# Patient Record
Sex: MALE | Race: OTHER | HISPANIC OR LATINO | URBAN - METROPOLITAN AREA
[De-identification: names, ages, dates, MRNs, and addresses within clinical notes are randomized per-mention and may not be internally consistent; named-entity substitution may affect disease eponyms.]

---

## 2017-02-07 ENCOUNTER — INPATIENT (INPATIENT)
Facility: HOSPITAL | Age: 62
LOS: 1 days | Discharge: HOME | DRG: 352 | End: 2017-02-09
Attending: SURGERY | Admitting: SURGERY
Payer: COMMERCIAL

## 2017-02-07 VITALS
HEIGHT: 67 IN | RESPIRATION RATE: 18 BRPM | TEMPERATURE: 98 F | WEIGHT: 147.49 LBS | SYSTOLIC BLOOD PRESSURE: 111 MMHG | DIASTOLIC BLOOD PRESSURE: 74 MMHG | HEART RATE: 88 BPM | OXYGEN SATURATION: 98 %

## 2017-02-07 DIAGNOSIS — Z98.89 OTHER SPECIFIED POSTPROCEDURAL STATES: Chronic | ICD-10-CM

## 2017-02-07 DIAGNOSIS — Z90.79 ACQUIRED ABSENCE OF OTHER GENITAL ORGAN(S): Chronic | ICD-10-CM

## 2017-02-07 LAB
APPEARANCE UR: CLEAR — SIGNIFICANT CHANGE UP
BILIRUB UR-MCNC: NEGATIVE — SIGNIFICANT CHANGE UP
COLOR SPEC: YELLOW — SIGNIFICANT CHANGE UP
DIFF PNL FLD: (no result)
GLUCOSE UR QL: NEGATIVE — SIGNIFICANT CHANGE UP
KETONES UR-MCNC: NEGATIVE — SIGNIFICANT CHANGE UP
LEUKOCYTE ESTERASE UR-ACNC: NEGATIVE — SIGNIFICANT CHANGE UP
NITRITE UR-MCNC: NEGATIVE — SIGNIFICANT CHANGE UP
PH UR: 7 — SIGNIFICANT CHANGE UP (ref 4–8)
PROT UR-MCNC: NEGATIVE MG/DL — SIGNIFICANT CHANGE UP
SP GR SPEC: 1.01 — SIGNIFICANT CHANGE UP (ref 1–1.03)
UROBILINOGEN FLD QL: 0.2 E.U./DL — SIGNIFICANT CHANGE UP

## 2017-02-07 PROCEDURE — 71010: CPT | Mod: 26

## 2017-02-07 PROCEDURE — 93010 ELECTROCARDIOGRAM REPORT: CPT

## 2017-02-07 PROCEDURE — 99284 EMERGENCY DEPT VISIT MOD MDM: CPT

## 2017-02-07 PROCEDURE — 74177 CT ABD & PELVIS W/CONTRAST: CPT | Mod: 26

## 2017-02-07 RX ORDER — SODIUM CHLORIDE 9 MG/ML
1000 INJECTION INTRAMUSCULAR; INTRAVENOUS; SUBCUTANEOUS ONCE
Qty: 0 | Refills: 0 | Status: COMPLETED | OUTPATIENT
Start: 2017-02-07 | End: 2017-02-07

## 2017-02-07 RX ORDER — DOCUSATE SODIUM 100 MG
100 CAPSULE ORAL DAILY
Qty: 0 | Refills: 0 | Status: DISCONTINUED | OUTPATIENT
Start: 2017-02-07 | End: 2017-02-09

## 2017-02-07 RX ORDER — LEVOTHYROXINE SODIUM 125 MCG
12.5 TABLET ORAL DAILY
Qty: 0 | Refills: 0 | Status: DISCONTINUED | OUTPATIENT
Start: 2017-02-08 | End: 2017-02-09

## 2017-02-07 RX ORDER — ONDANSETRON 8 MG/1
4 TABLET, FILM COATED ORAL EVERY 6 HOURS
Qty: 0 | Refills: 0 | Status: DISCONTINUED | OUTPATIENT
Start: 2017-02-07 | End: 2017-02-09

## 2017-02-07 RX ORDER — HEPARIN SODIUM 5000 [USP'U]/ML
5000 INJECTION INTRAVENOUS; SUBCUTANEOUS EVERY 8 HOURS
Qty: 0 | Refills: 0 | Status: DISCONTINUED | OUTPATIENT
Start: 2017-02-07 | End: 2017-02-09

## 2017-02-07 RX ORDER — SODIUM CHLORIDE 9 MG/ML
1000 INJECTION INTRAMUSCULAR; INTRAVENOUS; SUBCUTANEOUS
Qty: 0 | Refills: 0 | Status: DISCONTINUED | OUTPATIENT
Start: 2017-02-08 | End: 2017-02-08

## 2017-02-07 RX ORDER — HEPARIN SODIUM 5000 [USP'U]/ML
5000 INJECTION INTRAVENOUS; SUBCUTANEOUS EVERY 12 HOURS
Qty: 0 | Refills: 0 | Status: DISCONTINUED | OUTPATIENT
Start: 2017-02-07 | End: 2017-02-07

## 2017-02-07 RX ORDER — IOHEXOL 300 MG/ML
50 INJECTION, SOLUTION INTRAVENOUS ONCE
Qty: 0 | Refills: 0 | Status: COMPLETED | OUTPATIENT
Start: 2017-02-07 | End: 2017-02-07

## 2017-02-07 RX ADMIN — IOHEXOL 50 MILLILITER(S): 300 INJECTION, SOLUTION INTRAVENOUS at 11:26

## 2017-02-07 RX ADMIN — SODIUM CHLORIDE 1000 MILLILITER(S): 9 INJECTION INTRAMUSCULAR; INTRAVENOUS; SUBCUTANEOUS at 11:38

## 2017-02-07 RX ADMIN — Medication 100 MILLIGRAM(S): at 22:12

## 2017-02-07 NOTE — H&P ADULT. - ASSESSMENT
62yo M w/PMHx of GERD, MG, hypothyroidism, prostate cancer s/p RALP (10/24/16 Dr. Jiménez) who p/w symptomatic L indirect fat-containing inguinal hernia.  Hernia reducible, no obstructive symptoms.      - Admit to team 1 surgery (Dr. Barry)  - OR booked for open left inguinal hernia repair for tomorrow 2/8  - Will pre-op patient  - Regular diet; NPO and IVF @[place]  - Continue home colace, synthroid, ditropan prn  - PPx: SCDs, SQH, OOB/ambulation

## 2017-02-07 NOTE — ED ADULT TRIAGE NOTE - CHIEF COMPLAINT QUOTE
Patient c/o left groin pain for 5 days . Was sent by PMD for r/o  incarcerated hernia . Denies any fever nor chills .

## 2017-02-07 NOTE — ED ADULT NURSE NOTE - OBJECTIVE STATEMENT
Pt is complaining of left groin pain x 5 days that felt worse today. He denies pain at this time. Sent by Tino Hughes for r/o incarcerated hernia. H/o prostate surgery october 2016. Denes recent fever/chills, n/v/d. H/o intermittent incontinence.

## 2017-02-07 NOTE — H&P ADULT. - RS GEN PE MLT RESP DETAILS PC
airway patent/respirations non-labored/breath sounds equal/no wheezes/no rales/no rhonchi/clear to auscultation bilaterally

## 2017-02-07 NOTE — H&P ADULT. - RADIOLOGY RESULTS AND INTERPRETATION
EXAM:  CT ABDOMEN AND PELVIS OC IC     ///      PROCEDURE DATE:  02/07/2017    IMPRESSION:  1.  Minimal fat-containing left indirect inguinal hernia.  2.  1.0 cm lesion at upper pole of right kidney that does not represent a   simple cyst. MRI of the abdomen with and without gadolinium is   recommended for further evaluation.  3.  Mild dilatation of a few small bowel loops without evidence of   significant obstruction.

## 2017-02-07 NOTE — ED PROVIDER NOTE - OBJECTIVE STATEMENT
Patient is a 61 year old male with PMH prostate CA s/p prostatectomy, appendectomy who presents to ED c/o left groin pain for 5 days. The pain had been more mild and intermittent, however became more intense and constant since this morning. He was seen by his urologist Dr. Jiménez who then referred him to surgeon Dr. Sparrow-sent here for CT r/o incarcerated hernia. Pt reports normal bowel movement this morning. Denies vomiting, diarrhea, fever, chills, urinary symptoms, back pain, or testicular pain/swelling.

## 2017-02-07 NOTE — H&P ADULT. - GASTROINTESTINAL DETAILS
no guarding/soft/bowel sounds normal/no rebound tenderness/no distention/no rigidity/palpable L groin mass/nontender

## 2017-02-07 NOTE — H&P ADULT. - HISTORY OF PRESENT ILLNESS
60yo M w/PMHx 60yo M w/PMHx of GERD, MG, hypothyroidism, prostate cancer s/p RALP (10/24/16 Dr. Jiménez) who p/w symptomatic L indirect fat-containing inguinal hernia.  Patient was at a post-op 62yo M w/PMHx of GERD, MG, hypothyroidism, prostate cancer s/p RALP (10/24/16 Dr. Jiménez) who p/w symptomatic L indirect fat-containing inguinal hernia.  Patient was at a post-op visit with Dr. Jiménez when he started to experience severe left inguinal pain.  Tino referred patient to Dr. Barry at that time.  Patient was instructed to go to the ED for further work-up.  In the ED, patient's pain completely resolved.  However, patient still notices occasional twinges when he is walking/moving.  Patient denies n/v/f/c/diarrhea/constipation.  Denies abd pain/cp/sob.  Still passing flatus, +BM.  States that he has had several episodes of this progressively worsening L inguinal pain but that the episode today was the worst he has ever experienced.  Patient c/o incontinence issues at baseline but denies other gu symptoms.

## 2017-02-07 NOTE — ED PROVIDER NOTE - MEDICAL DECISION MAKING DETAILS
61 year old male with left groin pain x few days, worse today; sent by surgery for evaluation of possible hernia. Pt now pain free upon arrival in ED. Labs sent-unremarkable. CT abd/pel with fat containing inguinal hernia, no incarceration, no strangulation. Surgery consulted, came to ED to evaluate at bedside-pt to be admitted to have elective correction of inguinal hernia tomorrow.

## 2017-02-07 NOTE — ED PROVIDER NOTE - ATTENDING CONTRIBUTION TO CARE
pt is a 60yo m, h/o prostate ca s/p prostatectomy, referred to ED by Dr. Barry for right inguinal hernia. + right groin pain x 5 days which became acutely worse today. + associated nausea, no vomiting, diarrhea, constipation, dysuria, hematuria, flank pain, fever, chills. En route to ED, pain resolved and pt is now asymptomatic. Afebrile. HDS. WNWD m in nad, comfortable appearing. + s1, s2, rrr. Lungs cta b/l. Abd soft, nt/nd, no guarding/ rebound, no bulging hernias. Labs reviewed with findings of slight leukocytosis at 11.8. UA + for blood, no signs of infection. CT a/p + for minimal fat containing left inguinal hernia, r kidney lesion, and mild dilatation of a few small bowel loops without evidence of obstruction. Surgical consult obtained; pt admitted to [facility] for operative repair.

## 2017-02-08 LAB
ANION GAP SERPL CALC-SCNC: 6 MMOL/L — LOW (ref 9–16)
BUN SERPL-MCNC: 17 MG/DL — SIGNIFICANT CHANGE UP (ref 7–23)
CALCIUM SERPL-MCNC: 8.6 MG/DL — SIGNIFICANT CHANGE UP (ref 8.5–10.5)
CHLORIDE SERPL-SCNC: 106 MMOL/L — SIGNIFICANT CHANGE UP (ref 96–108)
CO2 SERPL-SCNC: 28 MMOL/L — SIGNIFICANT CHANGE UP (ref 22–31)
CREAT SERPL-MCNC: 1.28 MG/DL — SIGNIFICANT CHANGE UP (ref 0.5–1.3)
GLUCOSE SERPL-MCNC: 77 MG/DL — SIGNIFICANT CHANGE UP (ref 70–99)
HCT VFR BLD CALC: 39.2 % — SIGNIFICANT CHANGE UP (ref 39–50)
HGB BLD-MCNC: 13.3 G/DL — SIGNIFICANT CHANGE UP (ref 13–17)
MAGNESIUM SERPL-MCNC: 2 MG/DL — SIGNIFICANT CHANGE UP (ref 1.6–2.4)
MCHC RBC-ENTMCNC: 29.1 PG — SIGNIFICANT CHANGE UP (ref 27–34)
MCHC RBC-ENTMCNC: 33.9 G/DL — SIGNIFICANT CHANGE UP (ref 32–36)
MCV RBC AUTO: 85.8 FL — SIGNIFICANT CHANGE UP (ref 80–100)
PHOSPHATE SERPL-MCNC: 2.7 MG/DL — SIGNIFICANT CHANGE UP (ref 2.5–4.5)
PLATELET # BLD AUTO: 204 K/UL — SIGNIFICANT CHANGE UP (ref 150–400)
POTASSIUM SERPL-MCNC: 3.9 MMOL/L — SIGNIFICANT CHANGE UP (ref 3.5–5.3)
POTASSIUM SERPL-SCNC: 3.9 MMOL/L — SIGNIFICANT CHANGE UP (ref 3.5–5.3)
RBC # BLD: 4.57 M/UL — SIGNIFICANT CHANGE UP (ref 4.2–5.8)
RBC # FLD: 12.5 % — SIGNIFICANT CHANGE UP (ref 10.3–16.9)
SODIUM SERPL-SCNC: 140 MMOL/L — SIGNIFICANT CHANGE UP (ref 135–145)
WBC # BLD: 9.2 K/UL — SIGNIFICANT CHANGE UP (ref 3.8–10.5)
WBC # FLD AUTO: 9.2 K/UL — SIGNIFICANT CHANGE UP (ref 3.8–10.5)

## 2017-02-08 RX ORDER — HYDROMORPHONE HYDROCHLORIDE 2 MG/ML
1 INJECTION INTRAMUSCULAR; INTRAVENOUS; SUBCUTANEOUS ONCE
Qty: 0 | Refills: 0 | Status: DISCONTINUED | OUTPATIENT
Start: 2017-02-08 | End: 2017-02-09

## 2017-02-08 RX ORDER — SODIUM CHLORIDE 9 MG/ML
3 INJECTION INTRAMUSCULAR; INTRAVENOUS; SUBCUTANEOUS EVERY 8 HOURS
Qty: 0 | Refills: 0 | Status: DISCONTINUED | OUTPATIENT
Start: 2017-02-08 | End: 2017-02-09

## 2017-02-08 RX ORDER — DIPHENHYDRAMINE HCL 50 MG
25 CAPSULE ORAL ONCE
Qty: 0 | Refills: 0 | Status: COMPLETED | OUTPATIENT
Start: 2017-02-08 | End: 2017-02-08

## 2017-02-08 RX ADMIN — HEPARIN SODIUM 5000 UNIT(S): 5000 INJECTION INTRAVENOUS; SUBCUTANEOUS at 21:44

## 2017-02-08 RX ADMIN — SODIUM CHLORIDE 100 MILLILITER(S): 9 INJECTION INTRAMUSCULAR; INTRAVENOUS; SUBCUTANEOUS at 00:01

## 2017-02-08 RX ADMIN — Medication 25 MILLIGRAM(S): at 00:53

## 2017-02-08 NOTE — PROGRESS NOTE ADULT - SUBJECTIVE AND OBJECTIVE BOX
Procedure: Open Left inguinal hernia w/ mesh    Diagnosis/Indication: L inguinal hernia    Surgeon: Jhonny    S: Pt has no complaints. Denies CP, SOB, STOCKTON, calf tenderness. Pain controlled with medication.    O:  T(C): 36.1, Max: 36.7 (02-08 @ 18:40)  T(F): 97, Max: 98.1 (02-08 @ 18:40)  HR: 72 (70 - 79)  BP: 137/88 (116/81 - 137/88)  RR: 17 (16 - 17)  SpO2: 96% (96% - 99%)  Wt(kg): --                        13.3   9.2   )-----------( 204      ( 08 Feb 2017 06:33 )             39.2     08 Feb 2017 06:33    140    |  106    |  17     ----------------------------<  77     3.9     |  28     |  1.28     Ca    8.6        08 Feb 2017 06:33  Phos  2.7       08 Feb 2017 06:33  Mg     2.0       08 Feb 2017 06:33    TPro  7.5    /  Alb  3.7    /  TBili  0.7    /  DBili  x      /  AST  13     /  ALT  22     /  AlkPhos  78     07 Feb 2017 11:23      Gen: NAD, resting comfortably in bed  C/V: NSR  Pulm: Nonlabored breathing, no respiratory distress  Abd: soft, NT/ND, no guarding or rebound tenderness, dressing cdi  Extrem: WWP, no calf edema, SCDs in place      A/P: 61yMale s/p above procedure  Diet: Regular diet  IVF: NS@100  Pain/nausea control  DVT ppx: SQH  Dispo plan: DC tonight or AM

## 2017-02-08 NOTE — PROGRESS NOTE ADULT - SUBJECTIVE AND OBJECTIVE BOX
O/N: Afebrile, VSS. Refusing SQH Hep Afebrile, VSS. Refusing SQH Hep and Levothyroxine  Pt to go to OR today     SUBJECTIVE: Patient seen and examined bedside by chief resident.    heparin  Injectable 5000Unit(s) SubCutaneous every 8 hours      Vital Signs Last 24 Hrs  T(C): 36.3, Max: 36.9 ( @ 11:28)  T(F): 97.3, Max: 98.5 ( @ 11:28)  HR: 72 (66 - 88)  BP: 110/69 (109/71 - 136/84)  BP(mean): --  RR: 17 (15 - 18)  SpO2: 96% (17% - 99%)  I&O's Detail    I & Os for current day (as of 2017 07:34)  =============================================  IN:    sodium chloride 0.9%.: 800 ml    Oral Fluid: 770 ml    Total IN: 1570 ml  ---------------------------------------------  OUT:    Voided: 1450 ml    Total OUT: 1450 ml  ---------------------------------------------  Total NET: 120 ml      General: NAD, resting comfortably in bed  C/V: NSR  Pulm: Nonlabored breathing, no respiratory distress  Abd: soft, NT/ND.  Extrem: WWP, no edema, SCDs in place        LABS:                        13.3   9.2   )-----------( 204      ( 2017 06:33 )             39.2     2017 06:33    140    |  106    |  17     ----------------------------<  77     3.9     |  28     |  1.28     Ca    8.6        2017 06:33  Phos  2.7       2017 06:33  Mg     2.0       2017 06:33    TPro  7.5    /  Alb  3.7    /  TBili  0.7    /  DBili  x      /  AST  13     /  ALT  22     /  AlkPhos  78     2017 11:23    PT/INR - ( 2017 11:23 )   PT: 13.0 sec;   INR: 1.17          PTT - ( 2017 11:23 )  PTT:34.8 sec  Urinalysis Basic - ( 2017 11:30 )    Color: Yellow / Appearance: Clear / S.015 / pH: x  Gluc: x / Ketone: NEGATIVE  / Bili: NEGATIVE / Urobili: 0.2 E.U./dL   Blood: x / Protein: NEGATIVE mg/dL / Nitrite: NEGATIVE   Leuk Esterase: NEGATIVE / RBC: > 10 /HPF / WBC < 5 /HPF   Sq Epi: x / Non Sq Epi: x / Bacteria: Present /HPF        RADIOLOGY & ADDITIONAL STUDIES:

## 2017-02-08 NOTE — PROGRESS NOTE ADULT - ASSESSMENT
60yo M w/PMHx of GERD, MG, hypothyroidism, prostate cancer s/p RALP (10/24/16 Dr. Beasley) who p/w symptomatic L indirect fat-containing inguinal hernia    NPO/IVF  AM labs  OR for open L inguinal hernia repair  IS  OOBA  DVT ppx 60yo M w/PMHx of GERD, MG, hypothyroidism, prostate cancer s/p RALP (10/24/16 Dr. Beasley) who p/w symptomatic L indirect fat-containing inguinal hernia to go to OR today.    NPO/IVF  AM labs  OR for open L inguinal hernia repair  IS  OOBA  DVT ppx

## 2017-02-09 VITALS
RESPIRATION RATE: 17 BRPM | TEMPERATURE: 98 F | OXYGEN SATURATION: 96 % | HEART RATE: 70 BPM | DIASTOLIC BLOOD PRESSURE: 73 MMHG | SYSTOLIC BLOOD PRESSURE: 110 MMHG

## 2017-02-09 LAB
ANION GAP SERPL CALC-SCNC: 7 MMOL/L — LOW (ref 9–16)
BUN SERPL-MCNC: 15 MG/DL — SIGNIFICANT CHANGE UP (ref 7–23)
CALCIUM SERPL-MCNC: 8.8 MG/DL — SIGNIFICANT CHANGE UP (ref 8.5–10.5)
CHLORIDE SERPL-SCNC: 102 MMOL/L — SIGNIFICANT CHANGE UP (ref 96–108)
CO2 SERPL-SCNC: 27 MMOL/L — SIGNIFICANT CHANGE UP (ref 22–31)
CREAT SERPL-MCNC: 1.08 MG/DL — SIGNIFICANT CHANGE UP (ref 0.5–1.3)
GLUCOSE SERPL-MCNC: 76 MG/DL — SIGNIFICANT CHANGE UP (ref 70–99)
HCT VFR BLD CALC: 39.8 % — SIGNIFICANT CHANGE UP (ref 39–50)
HGB BLD-MCNC: 13.7 G/DL — SIGNIFICANT CHANGE UP (ref 13–17)
MAGNESIUM SERPL-MCNC: 1.9 MG/DL — SIGNIFICANT CHANGE UP (ref 1.6–2.4)
MCHC RBC-ENTMCNC: 29.1 PG — SIGNIFICANT CHANGE UP (ref 27–34)
MCHC RBC-ENTMCNC: 34.4 G/DL — SIGNIFICANT CHANGE UP (ref 32–36)
MCV RBC AUTO: 84.7 FL — SIGNIFICANT CHANGE UP (ref 80–100)
PHOSPHATE SERPL-MCNC: 3.5 MG/DL — SIGNIFICANT CHANGE UP (ref 2.5–4.5)
PLATELET # BLD AUTO: 195 K/UL — SIGNIFICANT CHANGE UP (ref 150–400)
POTASSIUM SERPL-MCNC: 3.8 MMOL/L — SIGNIFICANT CHANGE UP (ref 3.5–5.3)
POTASSIUM SERPL-SCNC: 3.8 MMOL/L — SIGNIFICANT CHANGE UP (ref 3.5–5.3)
RBC # BLD: 4.7 M/UL — SIGNIFICANT CHANGE UP (ref 4.2–5.8)
RBC # FLD: 12.1 % — SIGNIFICANT CHANGE UP (ref 10.3–16.9)
SODIUM SERPL-SCNC: 136 MMOL/L — SIGNIFICANT CHANGE UP (ref 135–145)
WBC # BLD: 12.3 K/UL — HIGH (ref 3.8–10.5)
WBC # FLD AUTO: 12.3 K/UL — HIGH (ref 3.8–10.5)

## 2017-02-09 PROCEDURE — 85730 THROMBOPLASTIN TIME PARTIAL: CPT

## 2017-02-09 PROCEDURE — 93005 ELECTROCARDIOGRAM TRACING: CPT

## 2017-02-09 PROCEDURE — C1781: CPT

## 2017-02-09 PROCEDURE — 71045 X-RAY EXAM CHEST 1 VIEW: CPT

## 2017-02-09 PROCEDURE — 85027 COMPLETE CBC AUTOMATED: CPT

## 2017-02-09 PROCEDURE — 99285 EMERGENCY DEPT VISIT HI MDM: CPT | Mod: 25

## 2017-02-09 PROCEDURE — 86901 BLOOD TYPING SEROLOGIC RH(D): CPT

## 2017-02-09 PROCEDURE — 85610 PROTHROMBIN TIME: CPT

## 2017-02-09 PROCEDURE — 84100 ASSAY OF PHOSPHORUS: CPT

## 2017-02-09 PROCEDURE — 83605 ASSAY OF LACTIC ACID: CPT

## 2017-02-09 PROCEDURE — 88304 TISSUE EXAM BY PATHOLOGIST: CPT

## 2017-02-09 PROCEDURE — 36415 COLL VENOUS BLD VENIPUNCTURE: CPT

## 2017-02-09 PROCEDURE — 86900 BLOOD TYPING SEROLOGIC ABO: CPT

## 2017-02-09 PROCEDURE — 80048 BASIC METABOLIC PNL TOTAL CA: CPT

## 2017-02-09 PROCEDURE — 83735 ASSAY OF MAGNESIUM: CPT

## 2017-02-09 PROCEDURE — 86850 RBC ANTIBODY SCREEN: CPT

## 2017-02-09 PROCEDURE — 81003 URINALYSIS AUTO W/O SCOPE: CPT

## 2017-02-09 PROCEDURE — 81001 URINALYSIS AUTO W/SCOPE: CPT

## 2017-02-09 PROCEDURE — 74177 CT ABD & PELVIS W/CONTRAST: CPT

## 2017-02-09 PROCEDURE — 80053 COMPREHEN METABOLIC PANEL: CPT

## 2017-02-09 RX ORDER — LEVOTHYROXINE SODIUM 125 MCG
1 TABLET ORAL
Qty: 0 | Refills: 0 | COMMUNITY
Start: 2017-02-09

## 2017-02-09 RX ORDER — LEVOTHYROXINE SODIUM 125 MCG
25 TABLET ORAL DAILY
Qty: 0 | Refills: 0 | Status: DISCONTINUED | OUTPATIENT
Start: 2017-02-09 | End: 2017-02-09

## 2017-02-09 RX ADMIN — HEPARIN SODIUM 5000 UNIT(S): 5000 INJECTION INTRAVENOUS; SUBCUTANEOUS at 06:15

## 2017-02-09 RX ADMIN — SODIUM CHLORIDE 3 MILLILITER(S): 9 INJECTION INTRAMUSCULAR; INTRAVENOUS; SUBCUTANEOUS at 06:14

## 2017-02-09 NOTE — DISCHARGE NOTE ADULT - CARE PLAN
Goal:	recovery  Instructions for follow-up, activity and diet:	Follow up with Dr. Barry in 2 weeks.  Call [phone] to schedule an appointment.  Regular diet.  No heavy lifting greater than 15lbs.    Take pain medication as prescribed.  Return to ER or call the doctor's office, if you develop fevers greater than 100.5, worsening pain, nausea, vomiting, difficulty urinating. Principal Discharge DX:	Inguinal hernia of left side without obstruction or gangrene  Goal:	recovery  Instructions for follow-up, activity and diet:	Continue to advance diet as tolerated. You may shower, but do not bathe or submerge in water for at least two weeks. You may remove the wound dressing tomorrow. Leave steri-strips in place if present; they will fall off on their own. Please be sure to keep the wound clean and dry, changing any dressings daily (except steri-strips), unless instructed otherwise. Apply ice packs to the left groin as much as tolerable over the next few days to help minimize swelling and pain. No heavy lifting or strenuous exercise until cleared by your surgeon.    Please follow up with Dr. Barry in one to two weeks; you may call the office to make an appointment at your earliest convenience.    Contact your doctor or go to the ER for fever > 101.5, chills, nausea, vomiting, chest pain, shortness of breath, pain not controlled by medication or excessive bleeding.

## 2017-02-09 NOTE — DISCHARGE NOTE ADULT - PATIENT PORTAL LINK FT
“You can access the [facility] Patient Portal, offered by [facility], by registering with the following website: [email or web address]”

## 2017-02-09 NOTE — DISCHARGE NOTE ADULT - NS AS ACTIVITY OBS
Stairs allowed/Showering allowed/Walking-Outdoors allowed/Walking-Indoors allowed/No Heavy lifting/straining

## 2017-02-09 NOTE — DISCHARGE NOTE ADULT - PLAN OF CARE
recovery Follow up with Dr. Barry in 2 weeks.  Call [phone] to schedule an appointment.  Regular diet.  No heavy lifting greater than 15lbs.    Take pain medication as prescribed.  Return to ER or call the doctor's office, if you develop fevers greater than 100.5, worsening pain, nausea, vomiting, difficulty urinating. Continue to advance diet as tolerated. You may shower, but do not bathe or submerge in water for at least two weeks. You may remove the wound dressing tomorrow. Leave steri-strips in place if present; they will fall off on their own. Please be sure to keep the wound clean and dry, changing any dressings daily (except steri-strips), unless instructed otherwise. Apply ice packs to the left groin as much as tolerable over the next few days to help minimize swelling and pain. No heavy lifting or strenuous exercise until cleared by your surgeon.    Please follow up with Dr. Barry in one to two weeks; you may call the office to make an appointment at your earliest convenience.    Contact your doctor or go to the ER for fever > 101.5, chills, nausea, vomiting, chest pain, shortness of breath, pain not controlled by medication or excessive bleeding.

## 2017-02-09 NOTE — DISCHARGE NOTE ADULT - HOSPITAL COURSE
62 yo M presented with symptomatic L indirect fat-containing inguinal hernia.  Patient underwent open L inguinal hernia w/ mesh on Feb 8th.  Patient tolerating regular diet, pain controlled, able to void with no difficulty.  Patient stable for discharge home with outpatient follow up. 61yoM with PMH GERD, myasthenia gravis, hypothyroidism, prostate CA s/p RALP (October 2016) admitted with painful left inguinal hernia for which he underwent an open repair with mesh. Patient's post-operative course was uncomplicated. Diet was advanced as tolerated and pain was well controlled on medication. On day of discharge, pt deemed stable and ready to return home with plan to follow up as an outpatient.

## 2017-02-09 NOTE — DISCHARGE NOTE ADULT - MEDICATION SUMMARY - MEDICATIONS TO TAKE
I will START or STAY ON the medications listed below when I get home from the hospital:    oxyCODONE-acetaminophen 5mg-325mg oral tablet  -- 1 tab(s) by mouth every 4 to 6 hours, As Needed -for severe pain MDD:6  -- Caution federal law prohibits the transfer of this drug to any person other  than the person for whom it was prescribed.  May cause drowsiness.  Alcohol may intensify this effect.  Use care when operating dangerous machinery.  This prescription cannot be refilled.  This product contains acetaminophen.  Do not use  with any other product containing acetaminophen to prevent possible liver damage.  Using more of this medication than prescribed may cause serious breathing problems.    -- Indication: For severe pain, as needed    levothyroxine 25 mcg (0.025 mg) oral tablet  -- 1 tab(s) by mouth once a day  -- Indication: For hypothyroidism/home med

## 2017-02-09 NOTE — PROGRESS NOTE ADULT - ASSESSMENT
60 yo M s/p above procedure  Regular diet  Pain Control  Nausea Control  DVT ppx  DC today 61yoM with PMH GERD, myasthenia gravis, hypothyroidism, prostate CA s/p RALP (October)  Regular diet  Pain Control  Nausea Control  DVT ppx  DC today

## 2017-02-09 NOTE — PROGRESS NOTE ADULT - SUBJECTIVE AND OBJECTIVE BOX
O/N: POC WNL, passed TOV  2/8: Awaiting OR. O/N: POC WNL, passed TOV  : Awaiting OR.    STATUS POST:  open left inguinal hernia repair POD1     SUBJECTIVE: Patient seen and examined bedside by chief resident. No complaints this AM. Pain well controlled by medication. No nausea, vomiting.    heparin  Injectable 5000Unit(s) SubCutaneous every 8 hours      Vital Signs Last 24 Hrs  T(C): 36.5, Max: 36.7 (- @ 12:08)  T(F): 97.7, Max: 98.1 ( @ 18:40)  HR: 70 (66 - 82)  BP: 110/73 (110/73 - 137/88)  BP(mean): --  RR: 17 (16 - 18)  SpO2: 96% (95% - 99%)  I&O's Detail    I & Os for current day (as of 2017 07:02)  =============================================  IN:    sodium chloride 0.9%: 400 ml    Oral Fluid: 380 ml    Total IN: 780 ml  ---------------------------------------------  OUT:    Voided: 3370 ml    Total OUT: 3370 ml  ---------------------------------------------  Total NET: -2590 ml      General: NAD, resting comfortably in bed  Pulm: Nonlabored breathing, no respiratory distress  Abd: soft, NT/ND. L groin appropriately tender to palpation. Dressing CDI, no evidence of hematoma or hemorrhage.  Extrem: WWP, no edema        LABS:                        13.3   9.2   )-----------( 204      ( 2017 06:33 )             39.2     2017 06:33    140    |  106    |  17     ----------------------------<  77     3.9     |  28     |  1.28     Ca    8.6        2017 06:33  Phos  2.7       2017 06:33  Mg     2.0       2017 06:33    TPro  7.5    /  Alb  3.7    /  TBili  0.7    /  DBili  x      /  AST  13     /  ALT  22     /  AlkPhos  78     2017 11:23    PT/INR - ( 2017 11:23 )   PT: 13.0 sec;   INR: 1.17          PTT - ( 2017 11:23 )  PTT:34.8 sec  Urinalysis Basic - ( 2017 11:30 )    Color: Yellow / Appearance: Clear / S.015 / pH: x  Gluc: x / Ketone: NEGATIVE  / Bili: NEGATIVE / Urobili: 0.2 E.U./dL   Blood: x / Protein: NEGATIVE mg/dL / Nitrite: NEGATIVE   Leuk Esterase: NEGATIVE / RBC: > 10 /HPF / WBC < 5 /HPF   Sq Epi: x / Non Sq Epi: x / Bacteria: Present /HPF        RADIOLOGY & ADDITIONAL STUDIES:

## 2017-02-13 DIAGNOSIS — K40.30 UNILATERAL INGUINAL HERNIA, WITH OBSTRUCTION, WITHOUT GANGRENE, NOT SPECIFIED AS RECURRENT: ICD-10-CM

## 2017-02-13 DIAGNOSIS — G70.00 MYASTHENIA GRAVIS WITHOUT (ACUTE) EXACERBATION: ICD-10-CM

## 2017-02-13 DIAGNOSIS — Z85.46 PERSONAL HISTORY OF MALIGNANT NEOPLASM OF PROSTATE: ICD-10-CM

## 2017-02-13 DIAGNOSIS — E03.9 HYPOTHYROIDISM, UNSPECIFIED: ICD-10-CM

## 2017-02-13 DIAGNOSIS — R10.32 LEFT LOWER QUADRANT PAIN: ICD-10-CM

## 2017-02-13 DIAGNOSIS — K21.9 GASTRO-ESOPHAGEAL REFLUX DISEASE WITHOUT ESOPHAGITIS: ICD-10-CM

## 2017-02-16 LAB — SURGICAL PATHOLOGY STUDY: SIGNIFICANT CHANGE UP

## 2017-04-18 ENCOUNTER — OUTPATIENT (OUTPATIENT)
Dept: OUTPATIENT SERVICES | Facility: HOSPITAL | Age: 62
LOS: 1 days | End: 2017-04-18
Payer: COMMERCIAL

## 2017-04-18 DIAGNOSIS — Z01.818 ENCOUNTER FOR OTHER PREPROCEDURAL EXAMINATION: ICD-10-CM

## 2017-04-18 DIAGNOSIS — Z98.89 OTHER SPECIFIED POSTPROCEDURAL STATES: Chronic | ICD-10-CM

## 2017-04-18 DIAGNOSIS — Z90.79 ACQUIRED ABSENCE OF OTHER GENITAL ORGAN(S): Chronic | ICD-10-CM

## 2017-04-18 LAB
ALBUMIN SERPL ELPH-MCNC: 3.9 G/DL — SIGNIFICANT CHANGE UP (ref 3.4–5)
ALP SERPL-CCNC: 79 U/L — SIGNIFICANT CHANGE UP (ref 40–120)
ALT FLD-CCNC: 27 U/L — SIGNIFICANT CHANGE UP (ref 12–42)
ANION GAP SERPL CALC-SCNC: 8 MMOL/L — LOW (ref 9–16)
APTT BLD: 33.6 SEC — SIGNIFICANT CHANGE UP (ref 27.5–37.4)
AST SERPL-CCNC: 25 U/L — SIGNIFICANT CHANGE UP (ref 15–37)
BILIRUB SERPL-MCNC: 0.8 MG/DL — SIGNIFICANT CHANGE UP (ref 0.2–1.2)
BUN SERPL-MCNC: 16 MG/DL — SIGNIFICANT CHANGE UP (ref 7–23)
CALCIUM SERPL-MCNC: 9 MG/DL — SIGNIFICANT CHANGE UP (ref 8.5–10.5)
CHLORIDE SERPL-SCNC: 106 MMOL/L — SIGNIFICANT CHANGE UP (ref 96–108)
CO2 SERPL-SCNC: 29 MMOL/L — SIGNIFICANT CHANGE UP (ref 22–31)
CREAT SERPL-MCNC: 1.13 MG/DL — SIGNIFICANT CHANGE UP (ref 0.5–1.3)
GLUCOSE SERPL-MCNC: 72 MG/DL — SIGNIFICANT CHANGE UP (ref 70–99)
HCT VFR BLD CALC: 43.2 % — SIGNIFICANT CHANGE UP (ref 39–50)
HGB BLD-MCNC: 15.3 G/DL — SIGNIFICANT CHANGE UP (ref 13–17)
INR BLD: 1.12 — SIGNIFICANT CHANGE UP (ref 0.88–1.16)
MCHC RBC-ENTMCNC: 30.3 PG — SIGNIFICANT CHANGE UP (ref 27–34)
MCHC RBC-ENTMCNC: 35.4 G/DL — SIGNIFICANT CHANGE UP (ref 32–36)
MCV RBC AUTO: 85.5 FL — SIGNIFICANT CHANGE UP (ref 80–100)
PLATELET # BLD AUTO: 235 K/UL — SIGNIFICANT CHANGE UP (ref 150–400)
POTASSIUM SERPL-MCNC: 4.2 MMOL/L — SIGNIFICANT CHANGE UP (ref 3.5–5.3)
POTASSIUM SERPL-SCNC: 4.2 MMOL/L — SIGNIFICANT CHANGE UP (ref 3.5–5.3)
PROT SERPL-MCNC: 7.5 G/DL — SIGNIFICANT CHANGE UP (ref 6.4–8.2)
PROTHROM AB SERPL-ACNC: 12.5 SEC — SIGNIFICANT CHANGE UP (ref 9.8–12.7)
RBC # BLD: 5.05 M/UL — SIGNIFICANT CHANGE UP (ref 4.2–5.8)
RBC # FLD: 13.2 % — SIGNIFICANT CHANGE UP (ref 10.3–16.9)
SODIUM SERPL-SCNC: 143 MMOL/L — SIGNIFICANT CHANGE UP (ref 135–145)
WBC # BLD: 10.5 K/UL — SIGNIFICANT CHANGE UP (ref 3.8–10.5)
WBC # FLD AUTO: 10.5 K/UL — SIGNIFICANT CHANGE UP (ref 3.8–10.5)

## 2017-04-18 PROCEDURE — 93005 ELECTROCARDIOGRAM TRACING: CPT

## 2017-04-18 PROCEDURE — 80053 COMPREHEN METABOLIC PANEL: CPT

## 2017-04-18 PROCEDURE — 93010 ELECTROCARDIOGRAM REPORT: CPT

## 2017-04-18 PROCEDURE — 85027 COMPLETE CBC AUTOMATED: CPT

## 2017-04-18 PROCEDURE — 85610 PROTHROMBIN TIME: CPT

## 2017-04-18 PROCEDURE — 85730 THROMBOPLASTIN TIME PARTIAL: CPT

## 2017-04-19 VITALS
TEMPERATURE: 98 F | OXYGEN SATURATION: 98 % | WEIGHT: 143.3 LBS | RESPIRATION RATE: 16 BRPM | SYSTOLIC BLOOD PRESSURE: 124 MMHG | HEIGHT: 67 IN | DIASTOLIC BLOOD PRESSURE: 72 MMHG | HEART RATE: 86 BPM

## 2017-04-20 ENCOUNTER — OUTPATIENT (OUTPATIENT)
Dept: OUTPATIENT SERVICES | Facility: HOSPITAL | Age: 62
LOS: 1 days | Discharge: HOME | End: 2017-04-20
Payer: COMMERCIAL

## 2017-04-20 VITALS
DIASTOLIC BLOOD PRESSURE: 88 MMHG | SYSTOLIC BLOOD PRESSURE: 128 MMHG | HEART RATE: 75 BPM | OXYGEN SATURATION: 97 % | RESPIRATION RATE: 15 BRPM

## 2017-04-20 DIAGNOSIS — Z90.79 ACQUIRED ABSENCE OF OTHER GENITAL ORGAN(S): Chronic | ICD-10-CM

## 2017-04-20 DIAGNOSIS — Z98.89 OTHER SPECIFIED POSTPROCEDURAL STATES: Chronic | ICD-10-CM

## 2017-04-20 DIAGNOSIS — Z98.890 OTHER SPECIFIED POSTPROCEDURAL STATES: Chronic | ICD-10-CM

## 2017-04-20 PROCEDURE — C1874: CPT

## 2017-04-20 PROCEDURE — 49505 PRP I/HERN INIT REDUC >5 YR: CPT | Mod: RT

## 2017-04-20 PROCEDURE — 88302 TISSUE EXAM BY PATHOLOGIST: CPT

## 2017-04-20 RX ORDER — ONDANSETRON 8 MG/1
4 TABLET, FILM COATED ORAL EVERY 6 HOURS
Qty: 0 | Refills: 0 | Status: DISCONTINUED | OUTPATIENT
Start: 2017-04-20 | End: 2017-04-20

## 2017-04-20 RX ORDER — SODIUM CHLORIDE 9 MG/ML
1000 INJECTION, SOLUTION INTRAVENOUS
Qty: 0 | Refills: 0 | Status: DISCONTINUED | OUTPATIENT
Start: 2017-04-20 | End: 2017-04-20

## 2017-04-20 NOTE — BRIEF OPERATIVE NOTE - OPERATION/FINDINGS
Preop Dx: Right Inguinal Hernia  Postop Dx: Same as above  Procedure: Open Right Inguinal Hernia Repair with Mesh  Findings: Right Direct and Indirect hernia noted

## 2017-04-25 DIAGNOSIS — K40.90 UNILATERAL INGUINAL HERNIA, WITHOUT OBSTRUCTION OR GANGRENE, NOT SPECIFIED AS RECURRENT: ICD-10-CM

## 2017-04-27 LAB — SURGICAL PATHOLOGY STUDY: SIGNIFICANT CHANGE UP

## 2021-10-01 NOTE — H&P ADULT. - CONSTITUTIONAL DETAILS
Duration Of Freeze Thaw-Cycle (Seconds): 10 Consent: The patient's consent was obtained including but not limited to risks of crusting, scabbing, blistering, scarring, darker or lighter pigmentary change, recurrence, incomplete removal and infection. Post-Care Instructions: I reviewed with the patient in detail post-care instructions. Patient is to wear sunprotection, and avoid picking at any of the treated lesions. Pt may apply Vaseline to crusted or scabbing areas. Render Note In Bullet Format When Appropriate: No Number Of Freeze-Thaw Cycles: 1 freeze-thaw cycle Detail Level: Detailed well-nourished/well-groomed/well-developed/no distress Show Aperture Variable?: Yes

## 2021-11-01 NOTE — ED PROVIDER NOTE - GASTROINTESTINAL [+], MLM
ABDOMINAL PAIN Detail Level: Simple Instructions: This plan will send the code FBSD to the PM system.  DO NOT or CHANGE the price. Price (Do Not Change): 0.00

## 2023-07-12 NOTE — ASU PREOP CHECKLIST - WEIGHT IN KG
65
Detail Level: Zone
Plan: Patient educated recommended Aquaphor
Render In Strict Bullet Format?: No